# Patient Record
Sex: MALE | Race: ASIAN | Employment: FULL TIME | URBAN - METROPOLITAN AREA
[De-identification: names, ages, dates, MRNs, and addresses within clinical notes are randomized per-mention and may not be internally consistent; named-entity substitution may affect disease eponyms.]

---

## 2023-01-10 RX ORDER — LEVOCETIRIZINE DIHYDROCHLORIDE 2.5 MG/5ML
SOLUTION ORAL DAILY
COMMUNITY

## 2023-01-10 RX ORDER — DIPHENHYDRAMINE HCL 25 MG
25 CAPSULE ORAL EVERY 6 HOURS PRN
COMMUNITY

## 2023-01-10 RX ORDER — PSEUDOEPHEDRINE HCL 30 MG
30 TABLET ORAL EVERY 4 HOURS PRN
COMMUNITY

## 2023-01-10 NOTE — PROGRESS NOTES
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 This procedure has been explained to my (our) satisfaction and included in the explanation was: The intended benefit, nature, and extent of the procedure to be performed; The significant risks involved and the probability of success; Alternative procedures and methods of treatment; The dangers and probable consequences of such alternatives (including no procedure or treatment); The expected consequences of the procedure on my future health; Whether other qualified individuals would be performing important surgical tasks and/or whether  would be present to advise or support the procedure. I (we) understand that there are other risks of infection and other serious complications in the pre-operative/procedural and postoperative/procedural stages of my (our) care. I (we) have asked all of the questions which I (we) thought were important in deciding whether or not to undergo treatment or diagnosis. These questions have been answered to my (our) satisfaction. I (we) understand that no assurance can be given that the procedure will be a success, and no guarantee or warranty of success has been given to me (us).     It has been explained to me (us) that during the course of the operation/procedure, unforeseen conditions may be revealed that necessitate extension of the original procedure(s) or different procedure(s) than those set forth in Paragraph 1. I (we) authorize and request that the above-named physician, his/her assistants or his/her designees, perform procedures as necessary and desirable if deemed to be in my (our) best interest.     Revised 8/2/2021                                                                          Page 1 of 2       I acknowledge that health care personnel may be observing this procedure for the purpose of medical education or other specified purposes as may be necessary as requested and/or approved by my (our) physician. I (we) consent to the disposal by the hospital Pathologist of the removed tissue, parts or organs in accordance with hospital policy. I do ____ do not ____ consent to the use of a local infiltration pain blocking agent that will be used by my provider/surgical provider to help alleviate pain during my procedure. I do ____ do not ____ consent to an emergent blood transfusion in the case of a life-threatening situation that requires blood components to be administered. This consent is valid for 24 hours from the beginning of the procedure. This patient does ____ or does not ____ currently have a DNR status/order. If DNR order is in place, obtain Addendum to the Surgical Consent for ALL Patients with a DNR Order to address junito-operative status for limited intervention or DNR suspension.      I have read and fully understand the above Consent for Operation/Procedure and that all blanks were completed before I signed the consent.   _____________________________       _____________________      ____/____am/pm  Signature of Patient or legal representative      Printed Name / Relationship            Date / Time   ____________________________       _____________________      ____/____am/pm  Witness to Signature                                    Printed Name                    Date / Time    If patient is unable to sign or is a minor, complete the following)  Patient is a minor, ____ years of age, or unable to sign because:   ______________________________________________________________________________________________    If a phone consent is obtained, consent will be documented by using two health care professionals, each affirming that the consenting party has no questions and gives consent for the procedure discussed with the physician/provider.   _____________________          ____________________       _____/_____am/pm   2nd witness to phone consent        Printed name           Date / Time    Informed Consent:  I have provided the explanation described above in section 1 to the patient and/or legal representative.  I have provided the patient and/or legal representative with an opportunity to ask any questions about the proposed operation/procedure.   ___________________________          ____________________         ____/____am/pm  Provider / Proceduralist                            Printed name            Date / Time  Revised 8/2/2021                                                                      Page 2 of 2

## 2023-01-10 NOTE — PROGRESS NOTES
[facility] PRE-SURGICAL TESTING INSTRUCTIONS                      PRIOR TO PROCEDURE DATE:    1. PLEASE FOLLOW ANY INSTRUCTIONS GIVEN TO YOU PER YOUR SURGEON. 2. Arrange for someone to drive you home and be with you for the first 24 hours after discharge for your safety after your procedure for which you received sedation. Ensure it is someone we can share information with regarding your discharge. NOTE: At this time ONLY 2 ADULTS may accompany you   One person ENCOURAGED to stay at hospital entire time if outpatient surgery      3. You must contact your surgeon for instructions IF:  You are taking any blood thinners, aspirin, anti-inflammatory or vitamins. There is a change in your physical condition such as a cold, fever, rash, cuts, sores, or any other infection, especially near your surgical site. 4. Do not drink alcohol the day before or day of your procedure. Do not use any recreational marijuana at least 24 hours or street drugs (heroin, cocaine) at minimum 5 days prior to your procedure. 5. A Pre-Surgical History and Physical MUST be completed WITHIN 30 DAYS OR LESS prior to your procedure. by your Physician or an Urgent Care        THE DAY OF YOUR PROCEDURE:  1. Follow instructions for ARRIVAL TIME as DIRECTED BY YOUR SURGEON. 2. Enter the MAIN entrance from [employer] and follow the signs to the free Parking [employer] or [employer] (offered free of charge 7 am-5pm). 3. Enter the Main Entrance of the hospital (do not enter from the lower level of the parking garage). Upon entrance, check in with the  at the surgical information desk on your LEFT. Bring your insurance card and photo ID to register      4. DO NOT EAT ANYTHING 8 hours prior to arrival for surgery. You may have up to 8 ounces of water 4 hours prior to your arrival for surgery.    NOTE: ALL Gastric, Bariatric & Bowel surgery patients - you MUST follow your surgeon's instructions regarding eating/ drinking as you will have very specific instructions to follow. If you did not receive these, call your surgeon's office immediately. 5. MEDICATIONS:  Take the following medications with a SMALL sip of water: NONE  Use your usual dose of inhalers the morning of surgery. BRING your rescue inhaler with you to hospital.   Anesthesia does NOT want you to take insulin the morning of surgery. They will control your blood sugar while you are at the hospital. Please contact your ordering physician for instructions regarding your insulin the night before your procedure. If you have an insulin pump, please keep it set on basal rate. Bariatric patient's call your surgeon if on diabetic medications as some may need to be stopped 1 week prior to surgery    6. Do not swallow additional water when brushing teeth. No gum, candy, mints, or ice chips. Refrain from smoking or at least decrease the amount on day of surgery. 7. Morning of surgery:   Take a shower with an antibacterial soap (i.e., Safeguard or Dial) OR your physician may have instructed you to use Hibiclens. Dress in loose, comfortable clothing appropriate for redressing after your procedure. Do not wear jewelry (including body piercings), make-up (especially NO eye make-up), fingernail polish (NO toenail polish if foot/leg surgery), lotion, powders, or metal hairclips. Do not shave or wax for 72 hours prior to procedure near your operative site. Shaving with a razor can irritate your skin and make it easier to develop an infection. On the day of your procedure, any hair that needs to be removed near the surgical site will be 'clipped' by a healthcare worker using a special clipper designed to avoid skin irritation. 8. Dentures, glasses, or contacts will need to be removed before your procedure. Bring cases for your glasses, contacts, dentures, or hearing aids to protect them while you are in surgery.       9. If you use a CPAP, please bring it with you on the day of your procedure. 10. We recommend that valuable personal belongings such as cash, cell phones, e-tablets, or jewelry, be left at home during your stay. The hospital will not be responsible for valuables that are not secured in the hospital safe. However, if your insurance requires a co-pay, you may want to bring a method of payment, i.e., Check or credit card, if you wish to pay your co-pay the day of surgery. 11. If you are to stay overnight, you may bring a bag with personal items. Please have any large items you may need brought in by your family after your arrival to your hospital room. 12. If you have a Living Will or Durable Power of , please bring a copy on the day of your procedure. How we keep you safe and work to prevent surgical site infections:   1. Health care workers should always check your ID bracelet to verify your name and birth date. You will be asked many times to state your name, date of birth, and allergies. 2. Health care workers should always clean their hands with soap or alcohol gel before providing care to you. It is okay to ask anyone if they cleaned their hands before they touch you. 3. You will be actively involved in verifying the type of procedure you are having and ensuring the correct surgical site. This will be confirmed multiple times prior to your procedure. Do NOT donn your surgery site UNLESS instructed to by your surgeon. 4. When you are in the operating room, your surgical site will be cleansed with a special soap, and in most cases, you will be given an antibiotic before the surgery begins. What to expect AFTER your procedure? 1. Immediately following your procedure, your will be taken to the PACU for the first phase of your recovery. Your nurse will help you recover from any potential side effects of anesthesia, such as extreme drowsiness, changes in your vital signs or breathing patterns.  Nausea, headache, muscle aches, or sore throat may also occur after anesthesia. Your nurse will help you manage these potential side effects. 2. For comfort and safety, arrange to have someone at home with you for the first 24 hours after discharge. 3. You and your family will be given written instructions about your diet, activity, dressing care, medications, and return visits. 4. Once at home, should issues with nausea, pain, or bleeding occur, or should you notice any signs of infection, you should call your surgeon. 5. Always clean your hands before and after caring for your wound. Do not let your family touch your surgery site without cleaning their hands. 6. Narcotic pain medications can cause significant constipation. You may want to add a stool softener to your postoperative medication schedule or speak to your surgeon on how best to manage this SIDE EFFECT. SPECIAL INSTRUCTIONS NONE    Thank you for allowing us to care for you. We strive to exceed your expectations in the delivery of care and service provided to you and your family. If you need to contact the [place] [address] staff for any reason, please call us at [phone]    Instructions reviewed with patient during preadmission testing phone interview.   Trevin Matos RN.1/10/2023 .11:41 AM      ADDITIONAL EDUCATIONAL INFORMATION REVIEWED PER PHONE WITH YOU AND/OR YOUR FAMILY:  No Hibiclens® Bathing Instructions   Yes Antibacterial Soap

## 2023-01-11 ENCOUNTER — ANESTHESIA EVENT (OUTPATIENT)
Dept: OPERATING ROOM | Age: 53
End: 2023-01-11
Payer: COMMERCIAL

## 2023-01-12 ENCOUNTER — ANESTHESIA (OUTPATIENT)
Dept: OPERATING ROOM | Age: 53
End: 2023-01-12
Payer: COMMERCIAL

## 2023-01-12 ENCOUNTER — HOSPITAL ENCOUNTER (OUTPATIENT)
Age: 53
Setting detail: OUTPATIENT SURGERY
Discharge: HOME | End: 2023-01-12
Attending: ORTHOPAEDIC SURGERY | Admitting: ORTHOPAEDIC SURGERY
Payer: COMMERCIAL

## 2023-01-12 VITALS
SYSTOLIC BLOOD PRESSURE: 149 MMHG | HEIGHT: 68 IN | HEART RATE: 78 BPM | RESPIRATION RATE: 16 BRPM | WEIGHT: 179.4 LBS | TEMPERATURE: 97.2 F | OXYGEN SATURATION: 100 % | BODY MASS INDEX: 27.19 KG/M2 | DIASTOLIC BLOOD PRESSURE: 97 MMHG

## 2023-01-12 LAB
GLUCOSE BLD-MCNC: 141 MG/DL (ref 70–99)
PERFORMED ON: ABNORMAL

## 2023-01-12 PROCEDURE — 64445 NJX AA&/STRD SCIATIC NRV IMG: CPT | Performed by: ANESTHESIOLOGY

## 2023-01-12 PROCEDURE — 6360000002 HC RX W HCPCS: Performed by: NURSE ANESTHETIST, CERTIFIED REGISTERED

## 2023-01-12 PROCEDURE — 2500000003 HC RX 250 WO HCPCS: Performed by: NURSE ANESTHETIST, CERTIFIED REGISTERED

## 2023-01-12 PROCEDURE — 3700000000 HC ANESTHESIA ATTENDED CARE: Performed by: ORTHOPAEDIC SURGERY

## 2023-01-12 PROCEDURE — 2709999900 HC NON-CHARGEABLE SUPPLY: Performed by: ORTHOPAEDIC SURGERY

## 2023-01-12 PROCEDURE — 3700000001 HC ADD 15 MINUTES (ANESTHESIA): Performed by: ORTHOPAEDIC SURGERY

## 2023-01-12 PROCEDURE — 6360000002 HC RX W HCPCS: Performed by: ORTHOPAEDIC SURGERY

## 2023-01-12 PROCEDURE — 2580000003 HC RX 258: Performed by: ANESTHESIOLOGY

## 2023-01-12 PROCEDURE — 7100000011 HC PHASE II RECOVERY - ADDTL 15 MIN: Performed by: ORTHOPAEDIC SURGERY

## 2023-01-12 PROCEDURE — 2580000003 HC RX 258: Performed by: NURSE ANESTHETIST, CERTIFIED REGISTERED

## 2023-01-12 PROCEDURE — C1713 ANCHOR/SCREW BN/BN,TIS/BN: HCPCS | Performed by: ORTHOPAEDIC SURGERY

## 2023-01-12 PROCEDURE — 2720000010 HC SURG SUPPLY STERILE: Performed by: ORTHOPAEDIC SURGERY

## 2023-01-12 PROCEDURE — 3600000004 HC SURGERY LEVEL 4 BASE: Performed by: ORTHOPAEDIC SURGERY

## 2023-01-12 PROCEDURE — 7100000000 HC PACU RECOVERY - FIRST 15 MIN: Performed by: ORTHOPAEDIC SURGERY

## 2023-01-12 PROCEDURE — A4217 STERILE WATER/SALINE, 500 ML: HCPCS | Performed by: ORTHOPAEDIC SURGERY

## 2023-01-12 PROCEDURE — 2580000003 HC RX 258: Performed by: ORTHOPAEDIC SURGERY

## 2023-01-12 PROCEDURE — 3600000014 HC SURGERY LEVEL 4 ADDTL 15MIN: Performed by: ORTHOPAEDIC SURGERY

## 2023-01-12 PROCEDURE — 6370000000 HC RX 637 (ALT 250 FOR IP): Performed by: ANESTHESIOLOGY

## 2023-01-12 PROCEDURE — 7100000010 HC PHASE II RECOVERY - FIRST 15 MIN: Performed by: ORTHOPAEDIC SURGERY

## 2023-01-12 PROCEDURE — 7100000001 HC PACU RECOVERY - ADDTL 15 MIN: Performed by: ORTHOPAEDIC SURGERY

## 2023-01-12 PROCEDURE — C1762 CONN TISS, HUMAN(INC FASCIA): HCPCS | Performed by: ORTHOPAEDIC SURGERY

## 2023-01-12 PROCEDURE — 64447 NJX AA&/STRD FEMORAL NRV IMG: CPT | Performed by: ANESTHESIOLOGY

## 2023-01-12 DEVICE — SCREW TI VAL KREULOCK   2.4X 16: Type: IMPLANTABLE DEVICE | Site: ANKLE | Status: FUNCTIONAL

## 2023-01-12 DEVICE — IMPLANTABLE DEVICE: Type: IMPLANTABLE DEVICE | Site: ANKLE | Status: FUNCTIONAL

## 2023-01-12 DEVICE — SCREW BNE L12MM DIA2.4MM CORT LOK LO PROF FOR COMPHSVE FT: Type: IMPLANTABLE DEVICE | Site: ANKLE | Status: FUNCTIONAL

## 2023-01-12 DEVICE — SCREW BNE L14MM DIA24MM CORT TI FULL THRD LO PROF: Type: IMPLANTABLE DEVICE | Site: ANKLE | Status: FUNCTIONAL

## 2023-01-12 DEVICE — SCREW BNE L16MM DIA2.4MM CORT TI FULL THRD LO PROF: Type: IMPLANTABLE DEVICE | Site: ANKLE | Status: FUNCTIONAL

## 2023-01-12 DEVICE — BONE GRAFT KIT 7510050 INFUSE XX SMALL
Type: IMPLANTABLE DEVICE | Site: ANKLE | Status: FUNCTIONAL
Brand: INFUSE® BONE GRAFT

## 2023-01-12 DEVICE — SCREW TI VAL KREULOCK 2.4X 14: Type: IMPLANTABLE DEVICE | Site: ANKLE | Status: FUNCTIONAL

## 2023-01-12 DEVICE — SCREW TI VAL KREULOCK   2.4X12: Type: IMPLANTABLE DEVICE | Site: ANKLE | Status: FUNCTIONAL

## 2023-01-12 RX ORDER — LIDOCAINE HYDROCHLORIDE 20 MG/ML
INJECTION, SOLUTION INTRAVENOUS PRN
Status: DISCONTINUED | OUTPATIENT
Start: 2023-01-12 | End: 2023-01-12 | Stop reason: SDUPTHER

## 2023-01-12 RX ORDER — LIDOCAINE HYDROCHLORIDE 10 MG/ML
1 INJECTION, SOLUTION EPIDURAL; INFILTRATION; INTRACAUDAL; PERINEURAL
Status: DISCONTINUED | OUTPATIENT
Start: 2023-01-12 | End: 2023-01-12 | Stop reason: HOSPADM

## 2023-01-12 RX ORDER — ROCURONIUM BROMIDE 10 MG/ML
INJECTION, SOLUTION INTRAVENOUS PRN
Status: DISCONTINUED | OUTPATIENT
Start: 2023-01-12 | End: 2023-01-12 | Stop reason: SDUPTHER

## 2023-01-12 RX ORDER — SODIUM CHLORIDE 0.9 % (FLUSH) 0.9 %
5-40 SYRINGE (ML) INJECTION PRN
Status: DISCONTINUED | OUTPATIENT
Start: 2023-01-12 | End: 2023-01-12 | Stop reason: HOSPADM

## 2023-01-12 RX ORDER — PROCHLORPERAZINE EDISYLATE 5 MG/ML
5 INJECTION INTRAMUSCULAR; INTRAVENOUS
Status: DISCONTINUED | OUTPATIENT
Start: 2023-01-12 | End: 2023-01-12 | Stop reason: HOSPADM

## 2023-01-12 RX ORDER — FENTANYL CITRATE 50 UG/ML
INJECTION, SOLUTION INTRAMUSCULAR; INTRAVENOUS PRN
Status: DISCONTINUED | OUTPATIENT
Start: 2023-01-12 | End: 2023-01-12 | Stop reason: SDUPTHER

## 2023-01-12 RX ORDER — GLYCOPYRROLATE 0.2 MG/ML
INJECTION INTRAMUSCULAR; INTRAVENOUS PRN
Status: DISCONTINUED | OUTPATIENT
Start: 2023-01-12 | End: 2023-01-12 | Stop reason: SDUPTHER

## 2023-01-12 RX ORDER — BUPIVACAINE HYDROCHLORIDE 5 MG/ML
INJECTION, SOLUTION EPIDURAL; INTRACAUDAL PRN
Status: DISCONTINUED | OUTPATIENT
Start: 2023-01-12 | End: 2023-01-12 | Stop reason: SDUPTHER

## 2023-01-12 RX ORDER — HYDROMORPHONE HCL 110MG/55ML
PATIENT CONTROLLED ANALGESIA SYRINGE INTRAVENOUS PRN
Status: DISCONTINUED | OUTPATIENT
Start: 2023-01-12 | End: 2023-01-12 | Stop reason: SDUPTHER

## 2023-01-12 RX ORDER — LABETALOL HYDROCHLORIDE 5 MG/ML
10 INJECTION, SOLUTION INTRAVENOUS
Status: DISCONTINUED | OUTPATIENT
Start: 2023-01-12 | End: 2023-01-12 | Stop reason: HOSPADM

## 2023-01-12 RX ORDER — ESMOLOL HYDROCHLORIDE 10 MG/ML
INJECTION INTRAVENOUS PRN
Status: DISCONTINUED | OUTPATIENT
Start: 2023-01-12 | End: 2023-01-12 | Stop reason: SDUPTHER

## 2023-01-12 RX ORDER — SODIUM CHLORIDE 9 MG/ML
INJECTION, SOLUTION INTRAVENOUS PRN
Status: DISCONTINUED | OUTPATIENT
Start: 2023-01-12 | End: 2023-01-12 | Stop reason: HOSPADM

## 2023-01-12 RX ORDER — BUPIVACAINE HYDROCHLORIDE 5 MG/ML
INJECTION, SOLUTION EPIDURAL; INTRACAUDAL
Status: COMPLETED
Start: 2023-01-12 | End: 2023-01-12

## 2023-01-12 RX ORDER — SODIUM CHLORIDE, SODIUM LACTATE, POTASSIUM CHLORIDE, CALCIUM CHLORIDE 600; 310; 30; 20 MG/100ML; MG/100ML; MG/100ML; MG/100ML
INJECTION, SOLUTION INTRAVENOUS CONTINUOUS PRN
Status: DISCONTINUED | OUTPATIENT
Start: 2023-01-12 | End: 2023-01-12 | Stop reason: SDUPTHER

## 2023-01-12 RX ORDER — DEXAMETHASONE SODIUM PHOSPHATE 4 MG/ML
INJECTION, SOLUTION INTRA-ARTICULAR; INTRALESIONAL; INTRAMUSCULAR; INTRAVENOUS; SOFT TISSUE PRN
Status: DISCONTINUED | OUTPATIENT
Start: 2023-01-12 | End: 2023-01-12 | Stop reason: SDUPTHER

## 2023-01-12 RX ORDER — ROPIVACAINE HYDROCHLORIDE 5 MG/ML
INJECTION, SOLUTION EPIDURAL; INFILTRATION; PERINEURAL
Status: COMPLETED
Start: 2023-01-12 | End: 2023-01-12

## 2023-01-12 RX ORDER — ONDANSETRON 2 MG/ML
INJECTION INTRAMUSCULAR; INTRAVENOUS PRN
Status: DISCONTINUED | OUTPATIENT
Start: 2023-01-12 | End: 2023-01-12 | Stop reason: SDUPTHER

## 2023-01-12 RX ORDER — SODIUM CHLORIDE, SODIUM LACTATE, POTASSIUM CHLORIDE, CALCIUM CHLORIDE 600; 310; 30; 20 MG/100ML; MG/100ML; MG/100ML; MG/100ML
INJECTION, SOLUTION INTRAVENOUS CONTINUOUS
Status: DISCONTINUED | OUTPATIENT
Start: 2023-01-12 | End: 2023-01-12 | Stop reason: HOSPADM

## 2023-01-12 RX ORDER — MIDAZOLAM HYDROCHLORIDE 1 MG/ML
INJECTION INTRAMUSCULAR; INTRAVENOUS PRN
Status: DISCONTINUED | OUTPATIENT
Start: 2023-01-12 | End: 2023-01-12 | Stop reason: SDUPTHER

## 2023-01-12 RX ORDER — ROPIVACAINE HYDROCHLORIDE 5 MG/ML
INJECTION, SOLUTION EPIDURAL; INFILTRATION; PERINEURAL PRN
Status: DISCONTINUED | OUTPATIENT
Start: 2023-01-12 | End: 2023-01-12 | Stop reason: SDUPTHER

## 2023-01-12 RX ORDER — SUCCINYLCHOLINE/SOD CL,ISO/PF 200MG/10ML
SYRINGE (ML) INTRAVENOUS PRN
Status: DISCONTINUED | OUTPATIENT
Start: 2023-01-12 | End: 2023-01-12 | Stop reason: SDUPTHER

## 2023-01-12 RX ORDER — ONDANSETRON 2 MG/ML
4 INJECTION INTRAMUSCULAR; INTRAVENOUS
Status: DISCONTINUED | OUTPATIENT
Start: 2023-01-12 | End: 2023-01-12 | Stop reason: HOSPADM

## 2023-01-12 RX ORDER — SODIUM CHLORIDE 0.9 % (FLUSH) 0.9 %
5-40 SYRINGE (ML) INJECTION EVERY 12 HOURS SCHEDULED
Status: DISCONTINUED | OUTPATIENT
Start: 2023-01-12 | End: 2023-01-12 | Stop reason: HOSPADM

## 2023-01-12 RX ORDER — MIDAZOLAM HYDROCHLORIDE 1 MG/ML
INJECTION INTRAMUSCULAR; INTRAVENOUS
Status: COMPLETED
Start: 2023-01-12 | End: 2023-01-12

## 2023-01-12 RX ORDER — OXYCODONE HYDROCHLORIDE 5 MG/1
5 TABLET ORAL
Status: COMPLETED | OUTPATIENT
Start: 2023-01-12 | End: 2023-01-12

## 2023-01-12 RX ORDER — ASPIRIN 325 MG
325 TABLET, DELAYED RELEASE (ENTERIC COATED) ORAL
Qty: 30 TABLET | Refills: 3 | COMMUNITY
Start: 2023-01-12

## 2023-01-12 RX ORDER — MEPERIDINE HYDROCHLORIDE 25 MG/ML
12.5 INJECTION INTRAMUSCULAR; INTRAVENOUS; SUBCUTANEOUS EVERY 5 MIN PRN
Status: DISCONTINUED | OUTPATIENT
Start: 2023-01-12 | End: 2023-01-12 | Stop reason: HOSPADM

## 2023-01-12 RX ORDER — PROPOFOL 10 MG/ML
INJECTION, EMULSION INTRAVENOUS PRN
Status: DISCONTINUED | OUTPATIENT
Start: 2023-01-12 | End: 2023-01-12 | Stop reason: SDUPTHER

## 2023-01-12 RX ORDER — HYDRALAZINE HYDROCHLORIDE 20 MG/ML
10 INJECTION INTRAMUSCULAR; INTRAVENOUS
Status: DISCONTINUED | OUTPATIENT
Start: 2023-01-12 | End: 2023-01-12 | Stop reason: HOSPADM

## 2023-01-12 RX ADMIN — PHENYLEPHRINE HYDROCHLORIDE 100 MCG: 10 INJECTION, SOLUTION INTRAMUSCULAR; INTRAVENOUS; SUBCUTANEOUS at 10:24

## 2023-01-12 RX ADMIN — SODIUM CHLORIDE, SODIUM LACTATE, POTASSIUM CHLORIDE, AND CALCIUM CHLORIDE: .6; .31; .03; .02 INJECTION, SOLUTION INTRAVENOUS at 09:54

## 2023-01-12 RX ADMIN — HYDROMORPHONE HYDROCHLORIDE 0.4 MG: 2 INJECTION, SOLUTION INTRAMUSCULAR; INTRAVENOUS; SUBCUTANEOUS at 13:01

## 2023-01-12 RX ADMIN — SUGAMMADEX 200 MG: 100 INJECTION, SOLUTION INTRAVENOUS at 12:51

## 2023-01-12 RX ADMIN — ONDANSETRON 4 MG: 2 INJECTION INTRAMUSCULAR; INTRAVENOUS at 12:12

## 2023-01-12 RX ADMIN — ROCURONIUM BROMIDE 5 MG: 10 INJECTION INTRAVENOUS at 10:03

## 2023-01-12 RX ADMIN — PROPOFOL 200 MG: 10 INJECTION, EMULSION INTRAVENOUS at 10:04

## 2023-01-12 RX ADMIN — ROPIVACAINE HYDROCHLORIDE 30 ML: 5 INJECTION, SOLUTION EPIDURAL; INFILTRATION; PERINEURAL at 09:34

## 2023-01-12 RX ADMIN — OXYCODONE 5 MG: 5 TABLET ORAL at 13:55

## 2023-01-12 RX ADMIN — SODIUM CHLORIDE, POTASSIUM CHLORIDE, SODIUM LACTATE AND CALCIUM CHLORIDE: 600; 310; 30; 20 INJECTION, SOLUTION INTRAVENOUS at 09:08

## 2023-01-12 RX ADMIN — BUPIVACAINE HYDROCHLORIDE 30 ML: 5 INJECTION, SOLUTION EPIDURAL; INTRACAUDAL; PERINEURAL at 09:31

## 2023-01-12 RX ADMIN — GLYCOPYRROLATE 0.2 MG: 0.2 INJECTION INTRAMUSCULAR; INTRAVENOUS at 10:19

## 2023-01-12 RX ADMIN — MIDAZOLAM HYDROCHLORIDE 2 MG: 2 INJECTION, SOLUTION INTRAMUSCULAR; INTRAVENOUS at 09:26

## 2023-01-12 RX ADMIN — HYDROMORPHONE HYDROCHLORIDE 0.4 MG: 2 INJECTION, SOLUTION INTRAMUSCULAR; INTRAVENOUS; SUBCUTANEOUS at 12:29

## 2023-01-12 RX ADMIN — DEXAMETHASONE SODIUM PHOSPHATE 4 MG: 4 INJECTION, SOLUTION INTRAMUSCULAR; INTRAVENOUS at 12:12

## 2023-01-12 RX ADMIN — ROCURONIUM BROMIDE 45 MG: 10 INJECTION INTRAVENOUS at 10:09

## 2023-01-12 RX ADMIN — ESMOLOL HYDROCHLORIDE 10 MG: 10 INJECTION, SOLUTION INTRAVENOUS at 12:19

## 2023-01-12 RX ADMIN — FENTANYL CITRATE 50 MCG: 50 INJECTION, SOLUTION INTRAMUSCULAR; INTRAVENOUS at 10:34

## 2023-01-12 RX ADMIN — PHENYLEPHRINE HYDROCHLORIDE 100 MCG: 10 INJECTION, SOLUTION INTRAMUSCULAR; INTRAVENOUS; SUBCUTANEOUS at 10:25

## 2023-01-12 RX ADMIN — CEFAZOLIN 2000 MG: 2 INJECTION, POWDER, FOR SOLUTION INTRAMUSCULAR; INTRAVENOUS at 09:55

## 2023-01-12 RX ADMIN — Medication 120 MG: at 10:04

## 2023-01-12 RX ADMIN — FENTANYL CITRATE 50 MCG: 50 INJECTION, SOLUTION INTRAMUSCULAR; INTRAVENOUS at 10:04

## 2023-01-12 RX ADMIN — LIDOCAINE HYDROCHLORIDE 50 MG: 20 INJECTION, SOLUTION INTRAVENOUS at 10:04

## 2023-01-12 RX ADMIN — SODIUM CHLORIDE, SODIUM LACTATE, POTASSIUM CHLORIDE, AND CALCIUM CHLORIDE: .6; .31; .03; .02 INJECTION, SOLUTION INTRAVENOUS at 12:46

## 2023-01-12 ASSESSMENT — PAIN DESCRIPTION - PAIN TYPE: TYPE: SURGICAL PAIN

## 2023-01-12 ASSESSMENT — PAIN DESCRIPTION - LOCATION: LOCATION: FOOT

## 2023-01-12 ASSESSMENT — PAIN - FUNCTIONAL ASSESSMENT: PAIN_FUNCTIONAL_ASSESSMENT: 0-10

## 2023-01-12 ASSESSMENT — PAIN SCALES - GENERAL: PAINLEVEL_OUTOF10: 1

## 2023-01-12 NOTE — PROGRESS NOTES
Ambulatory Surgery/Procedure Discharge Note    Vitals:    01/12/23 1423   BP: (!) 149/97   Pulse: 78   Resp: 16   Temp: 97.2 °F (36.2 °C)   SpO2: 100%     Pt meets discharge criteria per Reynaldo score. In: 9220 [P.O.:222; I.V.:1125]  Out: -     Restroom use offered before discharge. Yes    Pain assessment:  present - adequately treated  Pain Level: 1    Pt and S.O./family states \"ready to go home\". Pt alert and oriented x4. IV removed. Denies N/V. Ace bandage to R leg, c/d/I. Ice in place. Discharge instructions given to pt and friend with pt permission. Pt and friend verbalized understanding of all instructions. Left with all belongings, and discharge instructions. Prescriptions picked up prior to surgery. Patient discharged to home/self care.  Patient discharged via wheel chair by transporter to waiting family/S.O.       1/12/2023 2:38 PM

## 2023-01-12 NOTE — DISCHARGE INSTRUCTIONS
[address] [facility]  [phone]    POSTOPERATIVE INSTRUCTIONS    - For the first 48 hours after your surgery, rest and elevate the surgical area as much as possible. - Application of an ice pack to the area is helpful. A small amount of drainage or swelling may be expected. - Side effects to anesthesia may include nausea, lightheadedness, coughing, sore throat, and/or muscle aches. Rest, fluids, and light foods can help. Please call our office if:      - Your hand or foot becomes numb, blue, or cold. It is normal to experience numbness 12 to 24 hrs after surgery if you did receive a nerve block. - You have severe pain or burning which is not relieved with your pain medication.     - Your incision has become reddened or swollen, painful or has excessive bleeding or foul smelling drainage. You should have your first post-op appointment scheduled for tomorrow. Please check your surgery packet for date and time of this appointment. You have been given prescriptions for: Norco.  This was e-prescribed to the [address] Take Aspirin 325 mg twice daily until instructed to discontinue this medication. If your lower extremity was operated on:    Crutches/Roll-about/Wheelchair as needed to aid in ambulation. You are to remain non-weight bearing on your operative extremity. You may loosen your Ace wrap: as necessary for pain control    Your dressings will be changed at your first post-op visit. It is ok to shower 2 days after surgery, but you must keep your dressings clean and dry. 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   These side effects include:    Confusion or Memory loss, Dizziness, or Delayed Reaction Times   [x]A responsible person should be with you for the next 24 hours. Do not operate any vehicles (automobiles, bicycles, motorcycles) or power tools or machinery for 24 hours. Do not sign any legal documents or make any legal decisions for 24 hours. Do not drink alcohol for 24 hours or while taking narcotic pain medication. Nausea/Diet  [x] Nausea is not uncommon after having general anesthesia. Start with light diet and progress to your normal diet as you feel like eating. However, if you experience nausea or repeated episodes of vomiting which persist beyond 12-24 hours, notify your physician. Once nausea has passed, remember to keep drinking fluids. Difficulty Passing Urine  [x]Drink extra amounts of fluid today. Notify your physician if you have not urinated within 8 hours after your procedure or you feel uncomfortable. Irritated Throat from a Breathing Tube  [x]Drink extra amounts of fluid today. Lozenges may help. Muscle Aches  [x]You may experience some generalized body aches as your muscles recover from medications used to relax them during surgery. These will gradually subside. ACTIVITY INSTRUCTIONS:  [x]Rest today. Increase activity as tolerated. [x]No heavy lifting or strenuous activity  []No driving until cleared by your surgeon  []No driving while on narcotic pain medications or for 24 hours. Otherwise, you can drive when you can sit comfortably behind the steering wheel and can slam on the brake without it hurting or turn the wheel sharply without it hurting. Practice this while sitting in the car with it parked in your driveway before trying to drive. POST OPERATION WOUND CARE INSTRUCTIONS:       Follow instructions as instructed verbally and written by your Surgeon. Call Doctor for any questions or concerns at their office number. Someone will answer the phone 24hrs/7 days a week.     MEDICATION INSTRUCTIONS:  Prescription(S) x  1  were picked up by your friend from [employer]. Use as directed. Refer to your pharmacy's medication information sheets or your pharmacist for any questions you may have about the medicines prescribed. When taking pain medications, you may experience the side effect of dizziness or drowsiness. Do not drink alcohol or drive when taking these medications. If no prescriptions were sent home with you, you may take over the counter medications as needed for your discomfort unless your doctor directs differently    [x] You may resume all medications you were taking before surgery  [x] Give the list of your medications to your primary care physician on your next visit. Keep your med list updated and carry it with in case of emergencies. [x] If you take any blood thinning medication, such as Coumadin or Plavix, check with your surgeon on when to re-start taking it. [x] Narcotic pain medications can cause significant constipation. You may want to add a stool softener to your postoperative medication schedule or speak to your surgeon on how best to manage this side effect. NARCOTIC SAFETY:  Your pain medicine is only for you to take. Safely store your medicines. Store pills up high and out of reach of children and pets. Ensure safety caps are snapped tightly  Keep track of how many pills you have left    Unused medication can be disposed of by taking them to a drop-off box or take-back program that is authorized by the [facility]. Access to a site near you can be found on the [facility] [employer] website ([address]. [facility].gov). If you have a CPAP machine, it is very important that you use it daily during all periods of sleep and daytime rest during your recovery at home. Surgery and Anesthesia place a significant amount of stress on your body. Using your CPAP will help keep you safe and lessen the negative effects of that stress. If you smoke STOP.  Smoking can interfere with healing and your respiratory health after surgery. We care about your health! Watch for these significant complications. Call physician if they or any other problems occur:  Fever over 101 F°    Redness, swelling, hardness or warmth at the operative site  Unrelieved nausea    Foul smelling or cloudy drainage at the operative site   Unrelieved pain after taking medications as prescribed by your doctor    Blood soaked dressing. (Some oozing may be normal)  Numb, pale, blue, cold or tingling extremity  You have prolonged anesthesia/sedation side effects          FAQs  (frequently asked questions)  About Surgical Site Infections    What is a Surgical Site Infection (SSI)? A surgical site infection is an infection that occurs after surgery in the part of the body where the surgery took place. Most patients who have surgery do not develop an infection. However, infections develop in about 1 to 3 out of every 100 patients who have surgery. Some common symptoms of a surgical site infection are:   Redness and pain around the area where you had surgery  Drainage of cloudy fluid from your surgical wound   Fever    Can SSIs be treated? Yes. Most surgical site infections can be treated with antibiotics. The antibiotic given to you depends on the bacteria (germs) causing the infection. Sometimes patients with SSIs also need another surgery to treat the infection. What are some of the things that hospitals are doing to prevent SSIs? To prevent SSIs, doctors, nurses and other healthcare providers:   Clean their hands and arms up to their elbows with an antiseptic agent just before the surgery. Clean their hands with soap and water or an alcohol-based hand rub before and after caring for each patient. May remove some of your hair immediately before your surgery using electric clippers if the hair is in the same area where the procedure will occur. They should not shave you with a razor.    Wear special hair covers, masks, gowns, and gloves during surgery to keep the surgery area clean. Give you antibiotics before your surgery starts. In most cases, you should get antibiotics within 60 minutes before the surgery starts and the antibiotics should be stopped within 24 hours after surgery. Clean the skin at the site of your surgery with a special soap that kills germs. What can I do to help prevent SSIs? Before you surgery:  Tell your doctor about other medical problems you may have. Health problems such as allergies, diabetes, and obesity could affect your surgery and your treatment. Quit smoking. Patients who smoke get more infections. Talk to your doctor about how you can quit before your surgery. Do not shave near where you will have surgery. Shaving with a razor can irritate your skin and make it easier to develop an infection. At the time of your surgery:  Speak up if someone tries to shave you with a razor before surgery. Ask why you need to be shaved and talk with your surgeon if you have any concerns. Ask if you will get antibiotics before surgery. After your surgery:  Make sure that your healthcare providers clean their hands before examining you, either with soap and water or an alcohol-based hand rub. IF YOU DO NOT SEE YOUR PROVIDERS CLEAN THEIR HANDS, PLEASE ASK THEM TO DO SO. Family and friends who visit you should not touch the surgical wound or dressings. Family and friends should clean their hands with soap and water or an alcohol-based hand rub before and after visiting you. If you do not see them clean their hands, ask them to clean their hands. What do I need to do when I go home from the hospital?  Before you go home, your doctor nurses should explain everything you need to know about taking care of your wound. Make sure you understand how to care for your wound before you leave the hospital.    Always clean your hands before and after caring for your wound.     Before you go home, make sure you know who to contact if you have questions or problems after you get home. If you have any symptoms of an infection, such as redness and pain at the surgery site, drainage, or fever, call your doctor immediately. If you have additional questions, please ask your doctor or nurse.

## 2023-01-12 NOTE — ANESTHESIA PROCEDURE NOTES
Peripheral Block    Patient location during procedure: pre-op  Reason for block: post-op pain management and at surgeon's request  Start time: 1/12/2023 9:26 AM  End time: 1/12/2023 9:35 AM  Staffing  Performed: anesthesiologist   Anesthesiologist: Adrian Carias MD  Preanesthetic Checklist  Completed: patient identified, IV checked, site marked, risks and benefits discussed, surgical/procedural consents, equipment checked, pre-op evaluation, timeout performed, anesthesia consent given, oxygen available and monitors applied/VS acknowledged  Peripheral Block   Patient position: supine  Prep: ChloraPrep  Provider prep: mask and sterile gloves  Patient monitoring: cardiac monitor, continuous pulse ox, frequent blood pressure checks and IV access  Block type: Femoral  Adductor canal  Laterality: right  Injection technique: single-shot  Guidance: ultrasound guided  Local infiltration: lidocaine  Infiltration strength: 1 %  Local infiltration: lidocaine    Needle   Needle type: insulated echogenic nerve stimulator needle   Needle gauge: 22 G  Needle localization: ultrasound guidance  Needle length: 5 cm  Assessment   Injection assessment: negative aspiration for heme, no paresthesia on injection and local visualized surrounding nerve on ultrasound  Slow fractionated injection: yes  Hemodynamics: stable  Real-time US image taken/store: yes  Outcomes: uncomplicated and patient tolerated procedure well    Additional Notes  Sartorius and Vastus Medialis Muscle, Femoral artery and Saphenous nerve are identified; the tip of the needle and the spread of the local anesthetic around the Saphenous nerve are visualized. The Saphenous nerve appeared to be anatomically normal and there were no abnormal pathologically findings seen.

## 2023-01-12 NOTE — PROGRESS NOTES
PACU Transfer to \[facility]\"" #6    Procedure(s):  RIGHT REPAIR OF 5TH METATARSAL NONUNION WITH PLATING AND BONE Beena Bye    Dr Navid John    Pt's Current Allergies: Patient has no known allergies. Pt meets criteria to transfer to next phase of care per [facility] SCORE and [facility]AN standards    Recent Labs     01/12/23  0909   POCGLU 141*       Vitals:    01/12/23 1415   BP: (!) 139/97   Pulse: 78   Resp: 15   Temp: 97.8 °F (36.6 °C)   SpO2: 100%      BP within 20% of pt's admitting BP as per [facility] Score      Intake/Output Summary (Last 24 hours) at 1/12/2023 1418  Last data filed at 1/12/2023 1400  Gross per 24 hour   Intake 1347 ml   Output --   Net 1347 ml     Drank soda  Ate crackers    Pain assessment:  receiving treatment  Pain Level: 0    Patient received Roxicodone 5 mg PO to go home on  Received a block before surgery      Patient was assessed for unknown alterations to skin integrity. There were not unknown alterations observed. Has ace wrap and splint to operative site  Has crutches in car    Patient transferred to care of SDS RN.  Via handoff sheet  Family updated and directed to \[facility]\"" via waiting room staff    1/12/2023 2:18 PM

## 2023-01-12 NOTE — ANESTHESIA POSTPROCEDURE EVALUATION
Department of Anesthesiology  Postprocedure Note    Patient: Melodie Fairchild  MRN: [id number]  YOB: 1970  Date of evaluation: 1/12/2023      Procedure Summary     Date: 01/12/23 Room / Location: [address]    Anesthesia Start: 7802 Anesthesia Stop: 5166    Procedure: RIGHT REPAIR OF 5TH METATARSAL NONUNION WITH PLATING AND BONE GRAFTING, CALCANEUS OSTEOTOMY (Right) Diagnosis:       Closed displaced fracture of fifth metatarsal bone of right foot, initial encounter      Acquired varus deformity of right foot      Flat foot, acquired, right      (Closed displaced fracture of fifth metatarsal bone of right foot, initial encounter [S92.351A]  Acquired varus deformity of right foot [M21.171]   Flat foot, acquired, right [M21.41])    Surgeons: Shira Guillaume MD Responsible Provider: Cale Granda MD    Anesthesia Type: general ASA Status: 1          Anesthesia Type: No value filed.     Reynaldo Phase I: Reynaldo Score: 8    Reynaldo Phase II:        Anesthesia Post Evaluation    Patient location during evaluation: PACU  Patient participation: complete - patient participated  Level of consciousness: awake and alert  Airway patency: patent  Nausea & Vomiting: no nausea and no vomiting  Complications: no  Cardiovascular status: hemodynamically stable  Respiratory status: acceptable  Hydration status: euvolemic  Multimodal analgesia pain management approach

## 2023-01-12 NOTE — BRIEF OP NOTE
Brief Postoperative Note      Patient: Dennise Ruano  YOB: 1970  MRN: [id number]    Date of Procedure: 1/12/2023    Pre-Op Diagnosis: Closed displaced fracture of fifth metatarsal bone of right foot, initial encounter [S92.351A]  Acquired varus deformity of right foot [M21.171]   Flat foot, acquired, right [M21.41]    Post-Op Diagnosis: Same       Procedure(s):  RIGHT REPAIR OF 5TH METATARSAL NONUNION WITH PLATING AND BONE GRAFTING, CALCANEUS OSTEOTOMY    Surgeon(s):  Ana Valerio MD    Assistant:  Surgical Assistant: Felecia Andrew  First Assistant: MILENA Bishop    Anesthesia: General    Estimated Blood Loss (mL): Minimal    Complications: None    Specimens:   * No specimens in log *    Implants:  Implant Name Type Inv. Item Serial No.  Lot No. LRB No. Used Action   BIOFUSE PRO OSTEO VIABLE MATRI[employer] [facility]   [id number]   Right 1 Implanted         Drains: * No LDAs found *    Findings: See Above.      Electronically signed by Ana Valerio MD on 1/12/2023 at 12:29 PM

## 2023-01-12 NOTE — H&P
Date of Surgery Update:  Reymundo Liu was seen, history and physical examination reviewed, and patient examined by me today. There have been no significant clinical changes since the completion of the previous history and physical.    The risks, benefits, and alternatives of the proposed procedure have been explained to the patient (or appropriate guardian) and understanding verbalized. All questions answered. Patient wishes to proceed.     Electronically signed by: Kashif Mcneill MD,1/12/2023,10:07 AM

## 2023-01-12 NOTE — ANESTHESIA PRE PROCEDURE
Department of Anesthesiology  Preprocedure Note       Name:  Tito Loomis   Age:  47 y.o.  :  1970                                          MRN:  [id number]         Date:  2023      Surgeon: Tabitha Mcfadden):  Nasreen Morales MD    Procedure: Procedure(s):  RIGHT REPAIR OF 5TH METATARSAL NONUNION WITH PLATING AND BONE GRAFTING, CALCANEUS OSTEOTOMY, FIRST METATARSAL OSTEOTOMY, POSSIBLE PLANTAR FASCIA RELEASE, POSSIBLE GASTROCNEMIUS RECESSION  . Ya Hidden Medications prior to admission:   Prior to Admission medications    Medication Sig Start Date End Date Taking? Authorizing Provider   diphenhydrAMINE (BENADRYL) 25 MG capsule Take 25 mg by mouth every 6 hours as needed for Itching   Yes Historical Provider, MD   Levocetirizine Dihydrochloride 2.5 MG/5ML SOLN Take by mouth daily    Historical Provider, MD   pseudoephedrine (SUDAFED) 30 MG tablet Take 30 mg by mouth every 4 hours as needed    Historical Provider, MD       Current medications:    Current Facility-Administered Medications   Medication Dose Route Frequency Provider Last Rate Last Admin    lactated ringers infusion   IntraVENous Continuous Nasreen Morales MD        ceFAZolin (ANCEF) 2,000 mg in sodium chloride 0.9 % 50 mL IVPB (mini-bag)  2,000 mg IntraVENous Once Nasreen Morales MD        lidocaine PF 1 % injection 1 mL  1 mL IntraDERmal Once PRN Eddie Reilly DO        lactated ringers infusion   IntraVENous Continuous Eddie Reilly DO 50 mL/hr at 23 0908 New Bag at 23 0908       Allergies:  No Known Allergies    Problem List:  There is no problem list on file for this patient. Past Medical History:  History reviewed. No pertinent past medical history.     Past Surgical History:        Procedure Laterality Date    FOOT SURGERY Right     X4    KNEE SURGERY Left     X2       Social History:    Social History     Tobacco Use    Smoking status: Some Days     Types: Cigarettes    Smokeless tobacco: Never    Tobacco comments:     OCC CIGARS   Substance Use Topics    Alcohol use: Yes     Comment: COUPLE BEERS A DAY, AWARE NOT TO DRINK DAY BEFORE OR DOS-DG                                Ready to quit: Not Answered  Counseling given: Not Answered  Tobacco comments: OCC CIGARS      Vital Signs (Current):   Vitals:    01/10/23 1123 01/12/23 0852   BP:  (!) 146/97   Pulse:  85   Resp:  16   Temp:  97.9 °F (36.6 °C)   TempSrc:  Temporal   SpO2:  96%   Weight: 180 lb (81.6 kg) 179 lb 6.4 oz (81.4 kg)   Height: 5' 8\" (1.727 m) 5' 8\" (1.727 m)                                              BP Readings from Last 3 Encounters:   01/12/23 (!) 146/97       NPO Status: Time of last liquid consumption: 2200                        Time of last solid consumption: 2200                        Date of last liquid consumption: 01/11/23                        Date of last solid food consumption: 01/11/23    BMI:   Wt Readings from Last 3 Encounters:   01/12/23 179 lb 6.4 oz (81.4 kg)     Body mass index is 27.28 kg/m². CBC: No results found for: WBC, RBC, HGB, HCT, MCV, RDW, PLT    CMP: No results found for: NA, K, CL, CO2, BUN, CREATININE, GFRAA, AGRATIO, LABGLOM, GLUCOSE, GLU, PROT, CALCIUM, BILITOT, ALKPHOS, AST, ALT    POC Tests: No results for input(s): POCGLU, POCNA, POCK, POCCL, POCBUN, POCHEMO, POCHCT in the last 72 hours.     Coags: No results found for: PROTIME, INR, APTT    HCG (If Applicable): No results found for: PREGTESTUR, PREGSERUM, HCG, HCGQUANT     ABGs: No results found for: PHART, PO2ART, VFL9KGS, SBF1WVF, BEART, A8GXDFDU     Type & Screen (If Applicable):  No results found for: LABABO, LABRH    Drug/Infectious Status (If Applicable):  No results found for: HIV, HEPCAB    COVID-19 Screening (If Applicable): No results found for: COVID19        Anesthesia Evaluation  Patient summary reviewed and Nursing notes reviewed no history of anesthetic complications:   Airway: Mallampati: I  TM distance: >3 FB   Neck ROM: full  Mouth opening: > = 3 FB   Dental: normal exam         Pulmonary:Negative Pulmonary ROS and normal exam                               Cardiovascular:Negative CV ROS                      Neuro/Psych:   Negative Neuro/Psych ROS              GI/Hepatic/Renal: Neg GI/Hepatic/Renal ROS            Endo/Other: Negative Endo/Other ROS                    Abdominal:             Vascular: negative vascular ROS. Other Findings:           Anesthesia Plan      general     ASA 1     (Right popliteal adductor canal nerve block for post op pain control per surgeon request)  Induction: intravenous. MIPS: Postoperative opioids intended and Prophylactic antiemetics administered. Anesthetic plan and risks discussed with patient. Plan discussed with CRNA.     Attending anesthesiologist reviewed and agrees with Preprocedure content      Post-op pain plan if not by surgeon: single peripheral nerve block            Abram Merritt MD   1/12/2023

## 2023-01-12 NOTE — PROGRESS NOTES
RIGHT REPAIR OF 5TH METATARSAL NONUNION WITH PLATING AND BONE GRAFTING, CALCANEUS OSTEOTOMY   Dr Hess Ip    Current Allergies: Patient has no known allergies. Recent Labs     01/12/23  0909   POCGLU 141*       Admitted to PACU bed 13 from OR. Arrived on a stretcher, will be discharged. Attached to PACU monitoring system. Alarms and parameters set. Report received from anesthesia personnel. [address] staff did not report skin issues that were observed while in OR  No problems reported intraoperatively. Pt arrived with oxygen per  High flow nasal cannula with oxygen at 3 liters. Athrombic wrap and randell hose on non operative leg, wrap removed for discharge  Operative foot with splint and ace wrap, elevated and ice applied    Doctors aware of all labs before coming to recovery.

## 2023-01-14 NOTE — OP NOTE
[facility]               [address]                                OPERATIVE REPORT    PATIENT NAME: Vee León                        :        1970  MED REC NO:   [id number]                          ROOM:  ACCOUNT NO:   [de-identified]                           ADMIT DATE: 2023  PROVIDER:     Gabrielle Muñoz. Nellie Harper MD    DATE OF PROCEDURE:  2023    SURGEON:  Gabrielle Muñoz. Nellie Harper MD    SECOND SURGEON:  Mariah Linares PA-C    PREOPERATIVE DIAGNOSES:  Right,  1. Nonunion, fifth metatarsal fracture. 2.  Calcaneal varus deformity. POSTOPERATIVE DIAGNOSES:  Right,  1. Nonunion, fifth metatarsal fracture. 2.  Calcaneal varus deformity. OPERATIONS:  Right,  1. Repair nonunion fifth metatarsal with bone grafting. 2.  Calcaneus osteotomy. ANESTHESIA:  General with block. 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 He has previously  undergone surgery for this but has failed to unite the fracture. The  patient has elected for surgical revision due to chronic pain. In  addition, the patient has underlying calcaneal varus deformity. In the  past, he has undergone ligamentous reconstruction of his ankles. The  risks and potential benefits of the procedures were discussed with the  patient. He understands these, was given the opportunity to ask  questions. His questions answered to his satisfaction. He has given  consent to proceed with the above outlined procedures. DESCRIPTION OF PROCEDURE:  The patient was brought to the operating room  and placed in the supine position on the operating table. After  induction of a general anesthetic, a pneumatic tourniquet was placed on  the patient's right proximal thigh and set to 300 mmHg. The right leg  was then prepped and draped free in the usual sterile fashion.   The  extremity exsanguinated and the pneumatic tourniquet inflated. A second surgeon was necessary throughout the procedure due to the  complex nature of the procedure and the multiple procedures being  performed. A second surgeon was necessary to aid with appropriate  positioning of the patient and positioning of the extremity throughout  the procedure. A second surgeon was necessary to decrease overall  operative time and to improve the patient's safety and outcome. An  assistant with these skills was not available from the hospital at the  time of the procedure necessitating [facility] presence. [employer]  was present for and participated in all integral parts of the procedure. At this point, an intraoperative Silfverskiold test was performed. This  was negative and did not reveal an equinus contracture and, therefore,  gastrocnemius recession was not necessary. Calcaneus osteotomy:  At this point, the extremity was exsanguinated  with an Esmarch tourniquet and the pneumatic tourniquet inflated. Incision was made along the lateral aspect of the calcaneus and  dissection carried out through the subcutaneous and deeper tissue. Care  was taken to identify and protect the sural nerve at this level which  was gently elevated superiorly. A subperiosteal dissection was  performed laterally, superiorly, and medially at the calcaneal tuber. Baby Hohmann retractors were applied superiorly and inferiorly to  protect the medial soft tissue structures, and a high-speed handheld  oscillating saw was used to transect the calcaneal tuber. This was  freed with elevators and a laminar . Then, the calcaneal tuber  fragment was translated laterally about 6 mm to correct the patient's  underlying calcaneal varus deformity. This allowed alignment of the  calcaneus to approximately 7 degrees of valgus and this was felt to be  excellent correction.   The osteotomy was pinned with guidewires, then  secured with two [employer] 6.5-mm CompressX screws. These  afforded rigid and excellent compression and stability of the osteotomy  site. Bone graft was harvested from the osteotomy site by removing  prominent bone from the inferior tuber fragment and this was placed on  the back table for later use as bone graft. Alignment at this point was  assessed and noted to be quite good. Moving the calcaneus laterally in  this manner had corrected the patient's entire calcaneal alignment and  also had corrected the patient's forefoot adduction deformity. Simulated weightbearing appeared to have restored to normal.  This was  done by pressing a flat plate against the plantar aspect of the  patient's foot with no residual first metatarsal adduction or calcaneal  varus deformity recurring. It was therefore felt that a metatarsal  osteotomy was not necessary and would in fact overcorrect the patient's  foot. The calcaneal wound at this point was irrigated with a sterile  lavage solution and closed in layers. The subcutaneous tissue  reapproximated with 3-0 Vicryl suture and the skin edges reapproximated  with 3-0 nylon. Repair nonunion, fifth metatarsal:  At this point, an incision was made  over the lateral aspect of the fifth metatarsal.  Dissection was carried  out through the subcutaneous and deeper tissue taking care to identify  and protect the sural nerve which was gently retracted superiorly. Subperiosteal dissection was performed along the lateral aspect of the  fifth metatarsal.  The nonunion site was identified and entered with a  #15 scalpel. Positioning was verified with the use of multiplanar  fluoroscopy. The nonunion was opened with small curettes and a rongeur,  which was used to remove all fibrous tissue from the nonunion site. The  proximal and distal fragments had previously had an intramedullary screw  applied and this had also filled with fibrous tissue.   The fibrous  tissue was removed by placing a guidewire in the scar tissue of the  intramedullary canals proximally and distally, then over-reaming these  with a cannulated drill. This brought all surfaces back to  healthy-appearing bone. Abundant bone had been harvested from the  calcaneus and at this point, this was packed into the proximal and  distal fragments to restore bone in the intramedullary canals. Also, an  XXS Infuse sponge had been prepared per 's recommendations,  was morselized and placed throughout the nonunion site and also into the  intramedullary canals of the osseous fragments. Additional bone  grafting was required and a small amount of BioFuse was thawed, opened,  and placed into the nonunion site. Compression plating was done with an  Arthrex precontoured fifth metatarsal plate. This was first attached  proximally and the hook design was used to grasp the proximal aspect of  the fifth metatarsal.  Compression was then achieved both manually and  by using the compression hole which was drilled off set. Additional  fixation was achieved with locking and nonlocking screws. This afforded  rigid and excellent compression and stability of the nonunion, nicely  compressing the proximal and distal fragments along the entire nonunion  site. Final radiographs were obtained with multiplanar fluoroscopy. The wounds were irrigated and closed in layers. The subcutaneous tissue  reapproximated with 3-0 Vicryl suture. The skin edges reapproximated  with interrupted 3-0 nylon. There were no drains and no complications. The sponge and needle counts  were noted to be correct at the end of the procedure by the nursing  staff. Following closure and application of dressings, the patient was  awoken from his anesthetic and transferred to the postanesthesia care  unit in stable condition. Estimated blood loss was minimal due to use  of a tourniquet.         Milan Barajas MD    D: 01/13/2023 15:11:37       T: 01/13/2023 15:15:15 [email or web address]  Job#: [id number]     Doc#: [id number]    CC:

## 2024-09-25 ENCOUNTER — HOSPITAL ENCOUNTER (OUTPATIENT)
Dept: GENERAL RADIOLOGY | Facility: HOSPITAL | Age: 54
Discharge: HOME | End: 2024-09-25
Admitting: FAMILY MEDICINE
Payer: COMMERCIAL

## 2024-09-25 ENCOUNTER — TRANSCRIBE ORDERS (OUTPATIENT)
Dept: ADMINISTRATIVE | Facility: HOSPITAL | Age: 54
End: 2024-09-25
Payer: COMMERCIAL

## 2024-09-25 DIAGNOSIS — M25.511 CHRONIC RIGHT SHOULDER PAIN: Primary | ICD-10-CM

## 2024-09-25 DIAGNOSIS — G89.29 CHRONIC RIGHT SHOULDER PAIN: Primary | ICD-10-CM

## 2024-09-25 PROCEDURE — 73030 X-RAY EXAM OF SHOULDER: CPT

## (undated) DEVICE — BIT DRL DIA1.7MM LNG FT ANK FOR COMPHSVE SYS

## (undated) DEVICE — SUTURE VCRL SZ 2-0 L27IN ABSRB UD L26MM SH 1/2 CIR J417H

## (undated) DEVICE — COVER,TABLE,HEAVY DUTY,77"X90",STRL: Brand: MEDLINE

## (undated) DEVICE — BIT DRL DIA4.5MM CANN FOR INTOSS FIX IOFIX

## (undated) DEVICE — APPLICATOR MEDICATED 26 CC SOLUTION HI LT ORNG CHLORAPREP

## (undated) DEVICE — SOLUTION IRRIG 3000ML 0.9% SOD CHL USP UROMATIC PLAS CONT

## (undated) DEVICE — DRESSING,GAUZE,XEROFORM,CURAD,1"X8",ST: Brand: CURAD

## (undated) DEVICE — BANDAGE,ELASTIC,ESMARK,STERILE,6"X9',LF: Brand: MEDLINE

## (undated) DEVICE — Device: Brand: MEDICAL ACTION INDUSTRIES

## (undated) DEVICE — SUTURE ETHLN SZ 3-0 L18IN NONABSORBABLE BLK FS-1 L24MM 3/8 663H

## (undated) DEVICE — SUTURE SZ 0 27IN 5/8 CIR UR-6  TAPER PT VIOLET ABSRB VICRYL J603H

## (undated) DEVICE — BANDAGE COBAN 4 IN COMPR W4INXL5YD FOAM COHESIVE QUIK STK SELF ADH SFT

## (undated) DEVICE — PADDING CAST W4INXL4YD HIGHLY ABSRB THAN COT EZ APPL

## (undated) DEVICE — SST TWIST DRILL, STANDARD, 1.6MM DIA. X 127MM: Brand: MICROAIRE®

## (undated) DEVICE — C-ARM PACK: Brand: C-ARM COVER

## (undated) DEVICE — TOWEL,STOP FLAG GOLD N-W: Brand: MEDLINE

## (undated) DEVICE — ANCHOR FIX DISP FOR ANK FRAC SYS BB-TAK

## (undated) DEVICE — STERILE-Z STAND AND BACK TABLE COVER 66IN X 95IN: Brand: STERILE-Z

## (undated) DEVICE — ROLL COT W11.5INXL11FT 1LB HIGHLY ABSRB SURG GRD

## (undated) DEVICE — SUTURE PDS II SZ 2-0 L27IN ABSRB VLT L26MM CT-2 1/2 CIR Z333H

## (undated) DEVICE — SYRINGE MED 10ML LUERLOCK TIP W/O SFTY DISP

## (undated) DEVICE — SPLINT QUICK STEP SCOTCHCAST 5 X 30

## (undated) DEVICE — COVER LT HNDL BLU PLAS

## (undated) DEVICE — CONNECTOR TBNG WHT PLAS SUCT STR 5IN1 LTWT W/ M CONN

## (undated) DEVICE — PROTECTOR ULN NRV PUR FOAM HK LOOP STRP ANATOMICALLY

## (undated) DEVICE — CABLE BPLR L12FT FLYING LD DISPOSABLE

## (undated) DEVICE — TOWEL,OR,DSP,ST,BLUE,DLX,8/PK,10PK/CS: Brand: MEDLINE

## (undated) DEVICE — GLOVE SURG SZ 85 L1185IN FNGR THK75MIL STRW LTX POLYMER

## (undated) DEVICE — SOLUTION IV 1000ML 0.9% SOD CHL

## (undated) DEVICE — SUTURE ETHLN SZ 3-0 L18IN NONABSORBABLE BLK PS-2 L19MM 3/8 1669H

## (undated) DEVICE — SUTURE VCRL SZ 3-0 L27IN ABSRB UD L26MM SH 1/2 CIR J416H

## (undated) DEVICE — GOWN,SIRUS,POLYRNF,BRTHSLV,XLN/XL,20/CS: Brand: MEDLINE

## (undated) DEVICE — SUTURE ABSORBABLE BRAIDED 2-0 CT-1 27 IN UD VICRYL J259H

## (undated) DEVICE — GLOVE SURG SZ 65 L12IN FNGR THK79MIL GRN LTX FREE

## (undated) DEVICE — Device

## (undated) DEVICE — CARBIDE BUR,ROUND CUTTING, 10 FLUTES, MED., 5MM DIA.: Brand: MICROAIRE®

## (undated) DEVICE — PRECISION (9.0 X 0.51 X 25.0MM)

## (undated) DEVICE — COUNTER NDL 40 COUNT HLD 70 NUM FOAM BLK SGL MAG W BLDE REMV

## (undated) DEVICE — PODIATRY: Brand: MEDLINE INDUSTRIES, INC.

## (undated) DEVICE — GLOVE SURG SZ 65 THK91MIL LTX FREE SYN POLYISOPRENE

## (undated) DEVICE — 2108 SERIES SAGITTAL BLADE (24.8 X 0.88 X 73.8MM)